# Patient Record
(demographics unavailable — no encounter records)

---

## 2025-05-08 NOTE — PHYSICAL EXAM
[Alert] : alert [Sclera] : the sclera and conjunctiva were normal [No Respiratory Distress] : no respiratory distress [Abdomen Tenderness] : non-tender [No Masses] : no abdominal mass palpated [Abdomen Soft] : soft [Chaperone Present: ____] : chaperone present: [unfilled] [Oriented To Time, Place, And Person] : oriented to person, place, and time [de-identified] : hard stool palpated, hemorrhoid palpated, no bleeding visualized, no anal fissure

## 2025-05-08 NOTE — END OF VISIT
[] : Fellow [FreeTextEntry3] : 32M w/ no sig pmx here for 4 episodes of BRBPR this past week. Has been more constipated recently w/ ++ straining. [place] notable for hemorrhoids and hard stools. Famhx notable for colon polyps in father, PGF, and CRC in PGM. Will manage w/ fiber supp and pursue COL. Undiagnosed new problem.

## 2025-05-08 NOTE — ASSESSMENT
[FreeTextEntry1] : 31 yo male w/ no significant PMH who presents for 4 episodes of rectal bleeding:  Patient with 4 episodes of rectal bleeding in the setting of recent hard stools and hemorrhoid palpated on rectal exam. Rectal bleeding most likely 2/2 hemorrhoidal bleeding but given family history of colon cancer plan for endoscopic evaluation to rule out other sources of rectal bleeding and patient is in agreement with plan.  Plan: - Colonoscopy at [facility] with golytely prep - Discussed Risks / Benefits / Alternatives to Colon Cancer Screening, including endoscopic risks of bleeding, infection, perforation, or missed lesions - Discussed bowel prep prior to procedure - Discussed need for chaperone post procedurally - Advised patient to start daily fiber supplementation with methyl cellulose. Start with 1/2 dose for one week and then increase to full dose after one week with goal of regular daily BMs. Increase daily water intake, increase dietary fiber intake, ensure regular physical activity   Case discussed w/ GI attending Dr. Shen Escobedo MD PGY-4 Gastroenterology Fellow

## 2025-05-08 NOTE — HISTORY OF PRESENT ILLNESS
[FreeTextEntry1] : 33 yo male w/ no significant PMH who presents for 4 episodes of rectal bleeding:  Patient was in his usual state of health until one week ago when he felt constipated and was straining to have a BM and felt rectal discomfort and then saw bright red blood mixed with brown stool. Patient had 3 other episodes over the next few days with bright red blood on the toilet paper but not in the stool or in the toilet bowl. These episodes were sometimes associated with rectal discomfort. No recent anal trauma. No receptive anal intercourse. No associated abdominal pain. No dysphagia, odynophagia, early satiety, unintentional weight loss, fever, chills, melena, nausea, vomiting, heartburn, sour taste in throat, or food regurgitation.  EGD/Colonoscopy history: - No prior EGD or colonoscopy   Family hx: Family history of colon cancer in his grandmother (dx in her 50s)  Social Hx: - works as a consultant (under a lot of stress currently). Planning his wedding - Once a week EtOH use - No smoking hx - No recreational drug use  Surgical Hx: - no prior surgeries